# Patient Record
Sex: FEMALE | Race: WHITE | NOT HISPANIC OR LATINO | URBAN - METROPOLITAN AREA
[De-identification: names, ages, dates, MRNs, and addresses within clinical notes are randomized per-mention and may not be internally consistent; named-entity substitution may affect disease eponyms.]

---

## 2020-07-16 ENCOUNTER — OFFICE VISIT (OUTPATIENT)
Dept: URBAN - METROPOLITAN AREA CLINIC 68 | Facility: CLINIC | Age: 80
End: 2020-07-16

## 2020-07-17 ENCOUNTER — TELEPHONE ENCOUNTER (OUTPATIENT)
Dept: URBAN - METROPOLITAN AREA CLINIC 68 | Facility: CLINIC | Age: 80
End: 2020-07-17

## 2020-07-20 ENCOUNTER — TELEPHONE ENCOUNTER (OUTPATIENT)
Dept: URBAN - METROPOLITAN AREA CLINIC 68 | Facility: CLINIC | Age: 80
End: 2020-07-20

## 2020-08-17 LAB
COPY(IES) SENT TO:: (no result)
IMMUNOGLOBULIN A: (no result)
INTERPRETATION: (no result)
TISSUE TRANSGLUTAMINASE AB, IGA: (no result)

## 2020-08-21 ENCOUNTER — LAB OUTSIDE AN ENCOUNTER (OUTPATIENT)
Dept: URBAN - METROPOLITAN AREA CLINIC 68 | Facility: CLINIC | Age: 80
End: 2020-08-21

## 2020-08-23 ENCOUNTER — TELEPHONE ENCOUNTER (OUTPATIENT)
Dept: URBAN - METROPOLITAN AREA CLINIC 68 | Facility: CLINIC | Age: 80
End: 2020-08-23

## 2020-09-16 ENCOUNTER — OFFICE VISIT (OUTPATIENT)
Dept: URBAN - METROPOLITAN AREA CLINIC 68 | Facility: CLINIC | Age: 80
End: 2020-09-16

## 2022-06-04 ENCOUNTER — TELEPHONE ENCOUNTER (OUTPATIENT)
Dept: URBAN - METROPOLITAN AREA CLINIC 68 | Facility: CLINIC | Age: 82
End: 2022-06-04

## 2022-06-04 RX ORDER — POLYETHYLENE GLYCOL 3350 17 G/DOSE
POWDER (GRAM) ORAL PRN
Qty: 1 | Refills: 0 | OUTPATIENT
Start: 2013-06-19 | End: 2015-05-04

## 2022-06-04 RX ORDER — HYDROCORTISONE ACETATE 25 MG/1
SUPPOSITORY RECTAL
Qty: 12 | Refills: 12 | OUTPATIENT
Start: 2013-11-05 | End: 2014-07-22

## 2022-06-04 RX ORDER — ALPHA-D-GALACTOSIDASE 400 UNIT
BEANO(    2 TABLETS WITH EACH MEALS ) INACTIVE -HX ENTRY TABLET ORAL
OUTPATIENT
Start: 2008-11-04

## 2022-06-04 RX ORDER — MAGNESIUM OXIDE/MAG AA CHELATE 300 MG
MAGNESIUM( 300MG ORAL  DAILY ) INACTIVE -HX ENTRY CAPSULE ORAL DAILY
OUTPATIENT
Start: 2014-09-26

## 2022-06-04 RX ORDER — HYDROCORTISONE ACETATE 30 MG/1
SUPPOSITORY RECTAL
Qty: 28 | Refills: 0 | OUTPATIENT
Start: 2019-03-07 | End: 2019-03-21

## 2022-06-04 RX ORDER — PHYTONADIONE (VIT K1) 100 MCG
VITAMIN K (PHYTONADIONE)( 100MCG ORAL  DAILY ) INACTIVE -HX ENTRY TABLET ORAL DAILY
OUTPATIENT
Start: 2015-05-04

## 2022-06-04 RX ORDER — POLYETHYLENE GLYCOL 3350 17 G/DOSE
MIRALAX(  ORAL 17GRAMS AS NEEDED ) INACTIVE -HX ENTRY POWDER (GRAM) ORAL AS NEEDED
OUTPATIENT
Start: 2013-11-05

## 2022-06-04 RX ORDER — LACTOBACIL 2/BIFIDO 1/S.THERMO 900B CELL
VSL#3 DS(  ORAL  DAILY ) INACTIVE -HX ENTRY PACKET (EA) ORAL DAILY
OUTPATIENT
Start: 2019-03-07

## 2022-06-04 RX ORDER — HYDROCORTISONE ACETATE 25 MG/1
SUPPOSITORY RECTAL
Qty: 20 | Refills: 20 | OUTPATIENT
Start: 2018-05-04 | End: 2019-03-07

## 2022-06-04 RX ORDER — HYDROCORTISONE ACETATE 25 MG
SUPPOSITORY, RECTAL RECTAL
Qty: 14 | Refills: 0 | OUTPATIENT
Start: 2019-03-08 | End: 2019-03-15

## 2022-06-04 RX ORDER — ALENDRONATE SODIUM 70 MG/1
ALENDRONATE SODIUM( 70MG ORAL  ONE TIME A WEEK ) INACTIVE -HX ENTRY TABLET ORAL
OUTPATIENT
Start: 2014-09-26

## 2022-06-04 RX ORDER — ALENDRONATE SODIUM 70 MG
FOSAMAX(    1 TABLET WEEKLY ON AN EMPTY STOMACH ) INACTIVE -HX ENTRY TABLET ORAL
OUTPATIENT
Start: 2008-11-04

## 2022-06-04 RX ORDER — LORATADINE 10 MG
TABLET,DISINTEGRATING ORAL DAILY
Qty: 30 | Refills: 0 | OUTPATIENT
Start: 2018-04-10 | End: 2018-05-10

## 2022-06-04 RX ORDER — CLOTRIMAZOLE 2 G/100G
CREAM VAGINAL
Qty: 1 | Refills: 0 | OUTPATIENT
Start: 2013-02-20 | End: 2013-02-23

## 2022-06-04 RX ORDER — PANTOPRAZOLE SODIUM 40 MG/1
PANTOPRAZOLE SODIUM( 40MG ORAL  DAILY ) INACTIVE -HX ENTRY TABLET, DELAYED RELEASE ORAL DAILY
OUTPATIENT
Start: 2014-12-04

## 2022-06-04 RX ORDER — ESTRADIOL 14 UG/D
MENOSTAR( 14MCG/24HR TRANSDERMAL  AS DIRECTED ) INACTIVE -HX ENTRY PATCH TRANSDERMAL AS DIRECTED
OUTPATIENT
Start: 2015-05-04

## 2022-06-04 RX ORDER — SODIUM SULFATE, POTASSIUM SULFATE, MAGNESIUM SULFATE 17.5; 3.13; 1.6 G/ML; G/ML; G/ML
SOLUTION, CONCENTRATE ORAL AS DIRECTED
Qty: 1 | Refills: 0 | OUTPATIENT
Start: 2015-07-17 | End: 2017-10-03

## 2022-06-04 RX ORDER — LACTOBACILLUS CASEI/FOLIC ACID 60-1.25 MG
CAPSULE ORAL DAILY
Qty: 30 | Refills: 30 | OUTPATIENT
Start: 2017-10-03 | End: 2017-10-30

## 2022-06-04 RX ORDER — SOFOSBUVIR 400 MG/1
TABLET, FILM COATED ORAL DAILY
Qty: 30 | Refills: 30 | OUTPATIENT
Start: 2014-05-13 | End: 2015-02-20

## 2022-06-04 RX ORDER — DOCUSATE SODIUM 100 MG/1
COLACE( 100MG ORAL   AS NEEDED ) INACTIVE -HX ENTRY CAPSULE ORAL AS NEEDED
OUTPATIENT
Start: 2017-10-03

## 2022-06-04 RX ORDER — FLUCONAZOLE 150 MG/1
TABLET ORAL AS DIRECTED
Qty: 2 | Refills: 0 | OUTPATIENT
Start: 2013-02-20 | End: 2013-02-22

## 2022-06-04 RX ORDER — RALOXIFENE HYDROCHLORIDE 60 MG/1
RALOXIFENE HCL( 60MG ORAL  DAILY ) INACTIVE -HX ENTRY TABLET, FILM COATED ORAL DAILY
OUTPATIENT
Start: 2017-10-03

## 2022-06-04 RX ORDER — B-COMPLEX WITH VITAMIN C
VITAMIN B COMPLEX(  ORAL 1-2 TABS DAILY ) INACTIVE -HX ENTRY TABLET ORAL DAILY
OUTPATIENT
Start: 2017-10-03

## 2022-06-04 RX ORDER — B-COMPLEX WITH VITAMIN C
VITAMIN B COMPLEX(  ORAL  DAILY ) INACTIVE -HX ENTRY TABLET ORAL DAILY
OUTPATIENT
Start: 2014-05-13

## 2022-06-04 RX ORDER — LINACLOTIDE 145 UG/1
CAPSULE, GELATIN COATED ORAL DAILY
Qty: 30 | Refills: 30 | OUTPATIENT
Start: 2018-05-04 | End: 2019-03-07

## 2022-06-04 RX ORDER — OMEGA-3S/DHA/EPA/FISH OIL/D3 300MG-1000
CAPSULE ORAL DAILY
Qty: 0 | Refills: 0 | OUTPATIENT
Start: 2015-05-04 | End: 2015-05-04

## 2022-06-04 RX ORDER — GLUCOSAMINE/CHONDR SU A SOD 750-600 MG
VITAMIN E( 1000UNIT ORAL  DAILY ) INACTIVE -HX ENTRY TABLET ORAL DAILY
OUTPATIENT
Start: 2014-09-26

## 2022-06-05 ENCOUNTER — TELEPHONE ENCOUNTER (OUTPATIENT)
Dept: URBAN - METROPOLITAN AREA CLINIC 68 | Facility: CLINIC | Age: 82
End: 2022-06-05

## 2022-06-05 RX ORDER — ACETAMINOPHEN 325 MG
BIOTIN( 5000MCG ORAL  DAILY ) ACTIVE -HX ENTRY TABLET ORAL DAILY
Status: ACTIVE | COMMUNITY
Start: 2020-09-16

## 2022-06-05 RX ORDER — DENOSUMAB 60 MG/ML
PROLIA( 60MG/ML SUBCUTANEOUS   ) ACTIVE -HX ENTRY INJECTION SUBCUTANEOUS
Status: ACTIVE | COMMUNITY
Start: 2020-09-16

## 2022-06-05 RX ORDER — METHYLDOPA/HYDROCHLOROTHIAZIDE 250MG-15MG
VITAMIN K2( 100MCG ORAL   ) ACTIVE -HX ENTRY TABLET ORAL
Status: ACTIVE | COMMUNITY
Start: 2020-09-16

## 2022-06-05 RX ORDER — DICYCLOMINE HYDROCHLORIDE 10 MG/1
CAPSULE ORAL
Qty: 60 | Refills: 60 | Status: ACTIVE | COMMUNITY
Start: 2020-07-17

## 2022-06-05 RX ORDER — CHOLECALCIFEROL (VITAMIN D3) 125 MCG
VITAMIN D3( 125 MCG(5000 UT) ORAL   ) ACTIVE -HX ENTRY CAPSULE ORAL
Status: ACTIVE | COMMUNITY
Start: 2020-09-16

## 2022-06-05 RX ORDER — ATORVASTATIN CALCIUM 10 MG/1
ATORVASTATIN CALCIUM( 10MG ORAL 1 DAILY ) ACTIVE -HX ENTRY TABLET, FILM COATED ORAL DAILY
Status: ACTIVE | COMMUNITY
Start: 2020-09-16

## 2022-06-05 RX ORDER — CONJUGATED ESTROGENS 0.62 MG/G
PREMARIN( 0.625MG/GM VAGINAL   TWICE A WEEK ) ACTIVE -HX ENTRY CREAM VAGINAL
Status: ACTIVE | COMMUNITY
Start: 2020-09-16

## 2022-06-05 RX ORDER — MULTIVIT-MIN/FOLIC/VIT K/LYCOP 400-300MCG
VITAMIN D3( 1000UNIT ORAL  DAILY ) ACTIVE -HX ENTRY TABLET ORAL DAILY
Status: ACTIVE | COMMUNITY
Start: 2020-09-16

## 2022-06-25 ENCOUNTER — TELEPHONE ENCOUNTER (OUTPATIENT)
Age: 82
End: 2022-06-25

## 2022-06-25 RX ORDER — FENUGREEK SEED/BL.THISTLE/ANIS 340 MG
PROBIOTIC PRIMADOPHILUS ORIGINAL(   5 BILLION CFU DAILY ) INACTIVE -HX ENTRY CAPSULE ORAL DAILY
OUTPATIENT
Start: 2017-10-03

## 2022-06-25 RX ORDER — CLOTRIMAZOLE 2 G/100G
CREAM VAGINAL
Qty: 1 | Refills: 0 | OUTPATIENT
Start: 2013-02-20 | End: 2013-02-23

## 2022-06-25 RX ORDER — HYDROCORTISONE ACETATE 30 MG/1
SUPPOSITORY RECTAL
Qty: 28 | Refills: 0 | OUTPATIENT
Start: 2019-03-07 | End: 2019-03-21

## 2022-06-25 RX ORDER — HYDROCORTISONE ACETATE 25 MG/1
ANUSOL HC SUPP.(    1 PER RECTUM EACH NIGHT ) INACTIVE -HX ENTRY SUPPOSITORY RECTAL
OUTPATIENT
Start: 2009-07-07

## 2022-06-25 RX ORDER — SODIUM SULFATE, POTASSIUM SULFATE, MAGNESIUM SULFATE 17.5; 3.13; 1.6 G/ML; G/ML; G/ML
SOLUTION, CONCENTRATE ORAL AS DIRECTED
Qty: 1 | Refills: 0 | OUTPATIENT
Start: 2015-07-17 | End: 2017-10-03

## 2022-06-25 RX ORDER — POLYETHYLENE GLYCOL 3350 17 G
POWDER IN PACKET (EA) ORAL DAILY
Qty: 30 | Refills: 0 | OUTPATIENT
Start: 2018-04-10 | End: 2018-05-10

## 2022-06-25 RX ORDER — LACTOBACIL 2/BIFIDO 1/S.THERMO 900B CELL
VSL#3 DS(  ORAL  DAILY ) INACTIVE -HX ENTRY PACKET (EA) ORAL DAILY
OUTPATIENT
Start: 2019-03-07

## 2022-06-25 RX ORDER — LACTOBACILLUS CASEI/FOLIC ACID 60-1.25 MG
CAPSULE ORAL DAILY
Qty: 30 | Refills: 30 | OUTPATIENT
Start: 2017-10-03 | End: 2017-10-30

## 2022-06-25 RX ORDER — CALCIUM CIT/MAG/D3/ZN/COP/MANG 250-40-125
CALCIUM CITRATE(    2 QD ) INACTIVE -HX ENTRY TABLET ORAL
OUTPATIENT
Start: 2009-07-07

## 2022-06-25 RX ORDER — CALCIUM CARBONATE/VITAMIN D3 600 MG-10
CALCIUM(    DAILY ) INACTIVE -HX ENTRY TABLET ORAL DAILY
OUTPATIENT
Start: 2014-09-26

## 2022-06-25 RX ORDER — FERROUS SULFATE 325(65) MG
TABLET ORAL DAILY
Qty: 0 | Refills: 0 | OUTPATIENT
Start: 2015-05-04 | End: 2015-05-04

## 2022-06-25 RX ORDER — ESTRADIOL 14 UG/D
MENOSTAR( 14MCG/24HR TRANSDERMAL  AS DIRECTED ) INACTIVE -HX ENTRY PATCH TRANSDERMAL AS DIRECTED
OUTPATIENT
Start: 2015-05-04

## 2022-06-25 RX ORDER — LINACLOTIDE 145 UG/1
CAPSULE, GELATIN COATED ORAL DAILY
Qty: 30 | Refills: 30 | OUTPATIENT
Start: 2018-05-04 | End: 2019-03-07

## 2022-06-25 RX ORDER — PANTOPRAZOLE 40 MG/1
PANTOPRAZOLE SODIUM( 40MG ORAL  DAILY ) INACTIVE -HX ENTRY TABLET, DELAYED RELEASE ORAL DAILY
OUTPATIENT
Start: 2014-12-04

## 2022-06-25 RX ORDER — PHYTONADIONE (VIT K1) 100 MCG
VITAMIN K (PHYTONADIONE)( 100MCG ORAL  DAILY ) INACTIVE -HX ENTRY TABLET ORAL DAILY
OUTPATIENT
Start: 2015-05-04

## 2022-06-25 RX ORDER — LORATADINE 5 MG
TABLET,CHEWABLE ORAL PRN
Qty: 1 | Refills: 0 | OUTPATIENT
Start: 2013-06-19 | End: 2015-05-04

## 2022-06-25 RX ORDER — DOCUSATE SODIUM 100 MG/1
COLACE( 100MG ORAL   AS NEEDED ) INACTIVE -HX ENTRY CAPSULE ORAL AS NEEDED
OUTPATIENT
Start: 2017-10-03

## 2022-06-25 RX ORDER — MAGNESIUM OXIDE/MAG AA CHELATE 300 MG
MAGNESIUM( 300MG ORAL  DAILY ) INACTIVE -HX ENTRY CAPSULE ORAL DAILY
OUTPATIENT
Start: 2014-09-26

## 2022-06-25 RX ORDER — HYDROCORTISONE ACETATE 25 MG
SUPPOSITORY, RECTAL RECTAL
Qty: 14 | Refills: 0 | OUTPATIENT
Start: 2019-03-08 | End: 2019-03-15

## 2022-06-25 RX ORDER — LEVOTHYROXINE SODIUM 100 UG/1
LEVOTHYROXINE(   0.25MCG DAILY ) INACTIVE -HX ENTRY CAPSULE ORAL DAILY
OUTPATIENT
Start: 2020-07-16

## 2022-06-25 RX ORDER — ERGOCALCIFEROL 1.25 MG/1
VITAMIN D-400(    EVERY NOW AND THEN ) INACTIVE -HX ENTRY CAPSULE ORAL
OUTPATIENT
Start: 2009-07-07

## 2022-06-25 RX ORDER — FENUGREEK SEED/BL.THISTLE/ANIS 340 MG
PROBIOTIC MEDICAL FOOD(    DAILY ) INACTIVE -HX ENTRY CAPSULE ORAL DAILY
OUTPATIENT
Start: 2019-03-07

## 2022-06-25 RX ORDER — SOFOSBUVIR 400 MG/1
TABLET, FILM COATED ORAL DAILY
Qty: 30 | Refills: 30 | OUTPATIENT
Start: 2014-05-13 | End: 2015-02-20

## 2022-06-25 RX ORDER — POLYETHYLENE GLYCOL 3350, SODIUM SULFATE, SODIUM CHLORIDE, POTASSIUM CHLORIDE, ASCORBIC ACID, SODIUM ASCORBATE 7.5-2.691G
MOVIPREP(    AS DIRECTED ) INACTIVE -HX ENTRY KIT ORAL AS DIRECTED
OUTPATIENT
Start: 2010-07-20

## 2022-06-25 RX ORDER — RALOXIFENE HYDROCHLORIDE 60 MG/1
RALOXIFENE HCL( 60MG ORAL  DAILY ) INACTIVE -HX ENTRY TABLET, FILM COATED ORAL DAILY
OUTPATIENT
Start: 2017-10-03

## 2022-06-25 RX ORDER — B-COMPLEX WITH VITAMIN C
VITAMIN B COMPLEX(  ORAL  DAILY ) INACTIVE -HX ENTRY TABLET ORAL DAILY
OUTPATIENT
Start: 2014-05-13

## 2022-06-25 RX ORDER — LORATADINE 5 MG
MIRALAX(  ORAL 17GRAMS AS NEEDED ) INACTIVE -HX ENTRY TABLET,CHEWABLE ORAL AS NEEDED
OUTPATIENT
Start: 2013-11-05

## 2022-06-25 RX ORDER — OMEGA-3/DHA/EPA/FISH OIL 1000 MG
CAPSULE ORAL
OUTPATIENT
Start: 2009-07-07 | End: 2014-09-26

## 2022-06-25 RX ORDER — B-COMPLEX WITH VITAMIN C
VITAMIN B COMPLEX(  ORAL 1-2 TABS DAILY ) INACTIVE -HX ENTRY TABLET ORAL DAILY
OUTPATIENT
Start: 2017-10-03

## 2022-06-25 RX ORDER — WITCH HAZEL 50 G/100ML
SOLUTION RECTAL
Qty: 14 | Refills: 0 | OUTPATIENT
Start: 2019-03-08 | End: 2019-03-15

## 2022-06-25 RX ORDER — LANSOPRAZOLE 30 MG/1
PREVACID(    1 TABLET ORALLY 1 HOUR BEFORE BREAKFAST. ) INACTIVE -HX ENTRY TABLET, ORALLY DISINTEGRATING, DELAYED RELEASE ORAL
OUTPATIENT
Start: 2008-12-01

## 2022-06-25 RX ORDER — ALENDRONATE SODIUM 70 MG/1
ALENDRONATE SODIUM( 70MG ORAL  ONE TIME A WEEK ) INACTIVE -HX ENTRY TABLET ORAL
OUTPATIENT
Start: 2014-09-26

## 2022-06-25 RX ORDER — FLUCONAZOLE 150 MG
TABLET ORAL AS DIRECTED
Qty: 2 | Refills: 0 | OUTPATIENT
Start: 2013-02-20 | End: 2013-02-22

## 2022-06-25 RX ORDER — HYDROCORTISONE ACETATE 25 MG/1
SUPPOSITORY RECTAL
Qty: 20 | Refills: 20 | OUTPATIENT
Start: 2018-05-04 | End: 2019-03-07

## 2022-06-25 RX ORDER — GUARANA 1000 MG
MILK THISTLE(    AS DIRECTED ) INACTIVE -HX ENTRY TABLET ORAL AS DIRECTED
OUTPATIENT
Start: 2010-03-15

## 2022-06-25 RX ORDER — GLUCOSAMINE/CHONDR SU A SOD 750-600 MG
VITAMIN E( 1000UNIT ORAL  DAILY ) INACTIVE -HX ENTRY TABLET ORAL DAILY
OUTPATIENT
Start: 2014-09-26

## 2022-06-25 RX ORDER — ALENDRONATE SODIUM 70 MG/1
FOSAMAX(    1 TABLET WEEKLY ON AN EMPTY STOMACH ) INACTIVE -HX ENTRY TABLET ORAL
OUTPATIENT
Start: 2008-11-04

## 2022-06-25 RX ORDER — HYDROCORTISONE ACETATE 25 MG/1
SUPPOSITORY RECTAL
Qty: 12 | Refills: 12 | OUTPATIENT
Start: 2013-11-05 | End: 2014-07-22

## 2022-06-26 ENCOUNTER — TELEPHONE ENCOUNTER (OUTPATIENT)
Age: 82
End: 2022-06-26

## 2022-06-26 RX ORDER — DENOSUMAB 60 MG/ML
PROLIA( 60MG/ML SUBCUTANEOUS   ) ACTIVE -HX ENTRY INJECTION SUBCUTANEOUS
Status: ACTIVE | COMMUNITY
Start: 2020-09-16

## 2022-06-26 RX ORDER — METHYLDOPA/HYDROCHLOROTHIAZIDE 250MG-15MG
VITAMIN K2( 100MCG ORAL   ) ACTIVE -HX ENTRY TABLET ORAL
Status: ACTIVE | COMMUNITY
Start: 2020-09-16

## 2022-06-26 RX ORDER — CONJUGATED ESTROGENS 0.62 MG/G
PREMARIN( 0.625MG/GM VAGINAL   TWICE A WEEK ) ACTIVE -HX ENTRY CREAM VAGINAL
Status: ACTIVE | COMMUNITY
Start: 2020-09-16

## 2022-06-26 RX ORDER — MULTIVIT-MIN/FOLIC/VIT K/LYCOP 400-300MCG
VITAMIN D3( 1000UNIT ORAL  DAILY ) ACTIVE -HX ENTRY TABLET ORAL DAILY
Status: ACTIVE | COMMUNITY
Start: 2020-09-16

## 2022-06-26 RX ORDER — CHOLECALCIFEROL (VITAMIN D3) 125 MCG
VITAMIN D3( 125 MCG(5000 UT) ORAL   ) ACTIVE -HX ENTRY CAPSULE ORAL
Status: ACTIVE | COMMUNITY
Start: 2020-09-16

## 2022-06-26 RX ORDER — DICYCLOMINE HYDROCHLORIDE 10 MG/1
CAPSULE ORAL
Qty: 60 | Refills: 60 | Status: ACTIVE | COMMUNITY
Start: 2020-07-17

## 2022-06-26 RX ORDER — ATORVASTATIN CALCIUM 10 MG/1
ATORVASTATIN CALCIUM( 10MG ORAL 1 DAILY ) ACTIVE -HX ENTRY TABLET, FILM COATED ORAL DAILY
Status: ACTIVE | COMMUNITY
Start: 2020-09-16

## 2022-10-28 ENCOUNTER — OFFICE VISIT (OUTPATIENT)
Dept: URBAN - METROPOLITAN AREA CLINIC 68 | Facility: CLINIC | Age: 82
End: 2022-10-28

## 2022-10-28 RX ORDER — CONJUGATED ESTROGENS 0.62 MG/G
PREMARIN( 0.625MG/GM VAGINAL   TWICE A WEEK ) ACTIVE -HX ENTRY CREAM VAGINAL
Status: ACTIVE | COMMUNITY
Start: 2020-09-16

## 2022-10-28 RX ORDER — METHYLDOPA/HYDROCHLOROTHIAZIDE 250MG-15MG
VITAMIN K2( 100MCG ORAL   ) ACTIVE -HX ENTRY TABLET ORAL
Status: ACTIVE | COMMUNITY
Start: 2020-09-16

## 2022-10-28 RX ORDER — CHOLECALCIFEROL (VITAMIN D3) 125 MCG
VITAMIN D3( 125 MCG(5000 UT) ORAL   ) ACTIVE -HX ENTRY CAPSULE ORAL
Status: ACTIVE | COMMUNITY
Start: 2020-09-16

## 2022-10-28 RX ORDER — ATORVASTATIN CALCIUM 10 MG/1
ATORVASTATIN CALCIUM( 10MG ORAL 1 DAILY ) ACTIVE -HX ENTRY TABLET, FILM COATED ORAL DAILY
Status: ACTIVE | COMMUNITY
Start: 2020-09-16

## 2022-10-28 RX ORDER — DICYCLOMINE HYDROCHLORIDE 10 MG/1
CAPSULE ORAL
Qty: 60 | Refills: 60 | Status: DISCONTINUED | COMMUNITY
Start: 2020-07-17

## 2022-10-28 RX ORDER — MULTIVIT-MIN/FOLIC/VIT K/LYCOP 400-300MCG
VITAMIN D3( 1000UNIT ORAL  DAILY ) ACTIVE -HX ENTRY TABLET ORAL DAILY
Status: ACTIVE | COMMUNITY
Start: 2020-09-16

## 2022-10-28 RX ORDER — DENOSUMAB 60 MG/ML
PROLIA( 60MG/ML SUBCUTANEOUS   ) ACTIVE -HX ENTRY INJECTION SUBCUTANEOUS
Status: ACTIVE | COMMUNITY
Start: 2020-09-16

## 2022-10-28 RX ORDER — ACETAMINOPHEN 325 MG
BIOTIN( 5000MCG ORAL  DAILY ) ACTIVE -HX ENTRY TABLET ORAL DAILY
Status: ACTIVE | COMMUNITY
Start: 2020-09-16

## 2022-10-28 NOTE — EXAM-MIGRATED EXAMINATIONS
General Examination: General appearance: -> alert, pleasant, well-nourished and in no acute distress   Head: -> normocephalic, atraumatic   Eyes: -> normal   Neck / thyroid: -> neck is supple, with full range of motion and no cervical lymphadenopathy   Neurologic: -> alert and oriented   Skin: -> skin is warm and dry, with no rashes, good skin turgor and normal hair distribution   Heart: -> regular rate and rhythm without murmurs, gallops, clicks or rubs   Lungs: -> clear to auscultation bilaterally, with good air movement and no rales, rhonchi or wheezes   Chest: -> chest wall with no costochondral junction tenderness, no rib deformity and normal shape and expansion   Abdomen: -> soft with good bowel sounds, nontender, and no masses or hepatosplenomegaly , negative Jaimes's sign   Extremities: -> normal extremity with no clubbing, cyanosis or edema

## 2022-10-28 NOTE — HPI-MIGRATED HPI
Transition to Care : 82 y.o. WF with chronic constipation ongoing for years who is here for follow up of constipation.  She is s/p a colonoscopy in 2015 which showed hemorrhoids. She is concerned about taking Miralax daily because it is a chemical. She describes not eating a lot and has at times a bitter taste in her mouth.  She has lactose intolerance. She is recommended to take TUMS prn for dyspepsia. She denies any gib, no n/v.

## 2023-06-29 ENCOUNTER — LAB OUTSIDE AN ENCOUNTER (OUTPATIENT)
Dept: URBAN - METROPOLITAN AREA CLINIC 68 | Facility: CLINIC | Age: 83
End: 2023-06-29

## 2023-06-29 ENCOUNTER — WEB ENCOUNTER (OUTPATIENT)
Dept: URBAN - METROPOLITAN AREA CLINIC 68 | Facility: CLINIC | Age: 83
End: 2023-06-29

## 2023-06-29 ENCOUNTER — OFFICE VISIT (OUTPATIENT)
Dept: URBAN - METROPOLITAN AREA CLINIC 68 | Facility: CLINIC | Age: 83
End: 2023-06-29
Payer: MEDICARE

## 2023-06-29 VITALS
SYSTOLIC BLOOD PRESSURE: 172 MMHG | HEIGHT: 63 IN | DIASTOLIC BLOOD PRESSURE: 80 MMHG | WEIGHT: 110 LBS | BODY MASS INDEX: 19.49 KG/M2

## 2023-06-29 DIAGNOSIS — R10.13 ABDOMINAL DISCOMFORT, EPIGASTRIC: ICD-10-CM

## 2023-06-29 DIAGNOSIS — K62.5 RECTAL BLEEDING: ICD-10-CM

## 2023-06-29 DIAGNOSIS — K59.09 CHRONIC CONSTIPATION: ICD-10-CM

## 2023-06-29 PROBLEM — 119416008: Status: ACTIVE | Noted: 2023-06-29

## 2023-06-29 PROBLEM — 12063002: Status: ACTIVE | Noted: 2023-06-29

## 2023-06-29 PROCEDURE — 99214 OFFICE O/P EST MOD 30 MIN: CPT

## 2023-06-29 RX ORDER — CHOLECALCIFEROL (VITAMIN D3) 125 MCG
VITAMIN D3( 125 MCG(5000 UT) ORAL   ) ACTIVE -HX ENTRY CAPSULE ORAL
Status: ACTIVE | COMMUNITY
Start: 2020-09-16

## 2023-06-29 RX ORDER — ESCITALOPRAM 10 MG/1
TABLET, FILM COATED ORAL
Qty: 30 TABLET | Status: ACTIVE | COMMUNITY

## 2023-06-29 RX ORDER — LIOTHYRONINE SODIUM 5 UG/1
TABLET ORAL
Qty: 90 TABLET | Status: ACTIVE | COMMUNITY

## 2023-06-29 RX ORDER — POTASSIUM CHLORIDE 10 MEQ/1
TABLET, FILM COATED, EXTENDED RELEASE ORAL
Qty: 180 TABLET | Status: ACTIVE | COMMUNITY

## 2023-06-29 RX ORDER — METHYLDOPA/HYDROCHLOROTHIAZIDE 250MG-15MG
VITAMIN K2( 100MCG ORAL   ) ACTIVE -HX ENTRY TABLET ORAL
Status: ACTIVE | COMMUNITY
Start: 2020-09-16

## 2023-06-29 RX ORDER — ZOLPIDEM TARTRATE 5 MG/1
TAKE 1 TABLET BY MOUTH AT BEDTIME TABLET, FILM COATED ORAL
Qty: 90 EACH | Refills: 0 | Status: ACTIVE | COMMUNITY

## 2023-06-29 RX ORDER — ACETAMINOPHEN 325 MG
BIOTIN( 5000MCG ORAL  DAILY ) ACTIVE -HX ENTRY TABLET ORAL DAILY
Status: ACTIVE | COMMUNITY
Start: 2020-09-16

## 2023-06-29 RX ORDER — MULTIVIT-MIN/FOLIC/VIT K/LYCOP 400-300MCG
VITAMIN D3( 1000UNIT ORAL  DAILY ) ACTIVE -HX ENTRY TABLET ORAL DAILY
Status: ACTIVE | COMMUNITY
Start: 2020-09-16

## 2023-06-29 RX ORDER — LEVOTHYROXINE SODIUM 0.05 MG/1
TAKE 1 TABLET BY MOUTH EVERY DAY TABLET ORAL
Qty: 30 EACH | Refills: 0 | Status: ACTIVE | COMMUNITY

## 2023-06-29 RX ORDER — FUROSEMIDE 20 MG/1
TABLET ORAL
Qty: 90 TABLET | Status: ACTIVE | COMMUNITY

## 2023-06-29 RX ORDER — LOSARTAN POTASSIUM 25 MG/1
TAKE 1 TABLET BY MOUTH EVERY EVENING TABLET, FILM COATED ORAL
Qty: 90 EACH | Refills: 0 | Status: ACTIVE | COMMUNITY

## 2023-06-29 RX ORDER — WARFARIN SODIUM 5 MG/1
1 TABLET TABLET ORAL ONCE A DAY
Status: ACTIVE | COMMUNITY

## 2023-06-29 RX ORDER — DONEPEZIL HYDROCHLORIDE 5 MG/1
TAKE 1 TABLET BY MOUTH EVERY DAY TABLET, FILM COATED ORAL
Qty: 30 EACH | Refills: 0 | Status: ACTIVE | COMMUNITY

## 2023-06-29 NOTE — HPI-TODAY'S VISIT:
81 y/o F with extensive history of CAD, aortic valve replacement (3 yrs ago), chronic constipation (takes "smooth move" tea and prn dulcolax), Hepatitis C (resolved >10 yrs ago), IHs (colonoscopy 2015, Dr. Nino), personal history of colon polyps, and lactose intolerance. Pt presents for persistent rectal bleeding. She does report associated chronic constipation which worsens symptoms. She describes bleeding as a moderate amount of gross blood on toilet tissue, occurring more often than not when wiping after BMs. She admits she has great difficulty with her memory since her AV replacement surgery (cardiologist, Dr. Lucas) and is followed by a neurologist (Dr. Santos). She does have a home health nurse that helps her a few times throughout the week and normally comes with her to medical visits, although today she presents alone. Discussed recommendation for flex sig and provided instructions for OTC fleet enema. Additionally, discussed miralax for improvement of constipation and pt is advised to return to office one week prior to flex sig with her home health nurse for re-evaluation and instructions for prep prior to procedure. Patient otherwise admits to some epigastric pain to palpation and an abdominal US is ordered today. Pt denies vomiting, dysphagia, odynophagia, heartburn, diarrhea, or weight loss.

## 2023-06-29 NOTE — PHYSICAL EXAM GASTROINTESTINAL
Abdomen , soft, positive epigastric tenderness on moderate palpation, nondistended , no guarding or rigidity , no masses palpable , normal bowel sounds , Liver and Spleen , no hepatomegaly present , no hepatosplenomegaly , liver nontender , spleen not palpable

## 2023-07-13 ENCOUNTER — OFFICE VISIT (OUTPATIENT)
Dept: URBAN - METROPOLITAN AREA CLINIC 68 | Facility: CLINIC | Age: 83
End: 2023-07-13

## 2023-07-13 RX ORDER — MULTIVIT-MIN/FOLIC/VIT K/LYCOP 400-300MCG
VITAMIN D3( 1000UNIT ORAL  DAILY ) ACTIVE -HX ENTRY TABLET ORAL DAILY
Status: ACTIVE | COMMUNITY
Start: 2020-09-16

## 2023-07-13 RX ORDER — CHOLECALCIFEROL (VITAMIN D3) 125 MCG
VITAMIN D3( 125 MCG(5000 UT) ORAL   ) ACTIVE -HX ENTRY CAPSULE ORAL
Status: ACTIVE | COMMUNITY
Start: 2020-09-16

## 2023-07-13 RX ORDER — ZOLPIDEM TARTRATE 5 MG/1
TAKE 1 TABLET BY MOUTH AT BEDTIME TABLET, FILM COATED ORAL
Qty: 90 EACH | Refills: 0 | Status: ACTIVE | COMMUNITY

## 2023-07-13 RX ORDER — POTASSIUM CHLORIDE 10 MEQ/1
TABLET, FILM COATED, EXTENDED RELEASE ORAL
Qty: 180 TABLET | Status: ACTIVE | COMMUNITY

## 2023-07-13 RX ORDER — DONEPEZIL HYDROCHLORIDE 5 MG/1
TAKE 1 TABLET BY MOUTH EVERY DAY TABLET, FILM COATED ORAL
Qty: 30 EACH | Refills: 0 | Status: ACTIVE | COMMUNITY

## 2023-07-13 RX ORDER — ACETAMINOPHEN 325 MG
BIOTIN( 5000MCG ORAL  DAILY ) ACTIVE -HX ENTRY TABLET ORAL DAILY
Status: ACTIVE | COMMUNITY
Start: 2020-09-16

## 2023-07-13 RX ORDER — WARFARIN SODIUM 5 MG/1
1 TABLET TABLET ORAL ONCE A DAY
Status: ACTIVE | COMMUNITY

## 2023-07-13 RX ORDER — FUROSEMIDE 20 MG/1
TABLET ORAL
Qty: 90 TABLET | Status: ACTIVE | COMMUNITY

## 2023-07-13 RX ORDER — ESCITALOPRAM 10 MG/1
TABLET, FILM COATED ORAL
Qty: 30 TABLET | Status: ACTIVE | COMMUNITY

## 2023-07-13 RX ORDER — LIOTHYRONINE SODIUM 5 UG/1
TABLET ORAL
Qty: 90 TABLET | Status: ACTIVE | COMMUNITY

## 2023-07-13 RX ORDER — LEVOTHYROXINE SODIUM 0.05 MG/1
TAKE 1 TABLET BY MOUTH EVERY DAY TABLET ORAL
Qty: 30 EACH | Refills: 0 | Status: ACTIVE | COMMUNITY

## 2023-07-13 RX ORDER — METHYLDOPA/HYDROCHLOROTHIAZIDE 250MG-15MG
VITAMIN K2( 100MCG ORAL   ) ACTIVE -HX ENTRY TABLET ORAL
Status: ACTIVE | COMMUNITY
Start: 2020-09-16

## 2023-07-13 RX ORDER — LOSARTAN POTASSIUM 25 MG/1
TAKE 1 TABLET BY MOUTH EVERY EVENING TABLET, FILM COATED ORAL
Qty: 90 EACH | Refills: 0 | Status: ACTIVE | COMMUNITY

## 2023-07-13 NOTE — HPI-TODAY'S VISIT:
81 y/o F with extensive history of CAD, aortic valve replacement (3 yrs ago), chronic constipation (takes "smooth move" tea and prn dulcolax), Hepatitis C (resolved >10 yrs ago), IHs (colonoscopy 2015, Dr. Nino), personal history of colon polyps, and lactose intolerance. Pt presents for persistent rectal bleeding and does have an upcoming flex-sig scheduled with Dr. Gracia. She additionally admits to continued symptoms of constipation, unchanged form her last visit. Pt reports signifcant memory problems and did not have a historian to assist her at her last visit, although she does prsent with home nurse today***. She additionally had been recommended for abdominal US for evaluation of epoigastric pain, but states***.  Discussed recommendation to keep visit for flex sig and detailed instructions for OTC fleet enema were reviewed. Pt is currently utilizing *** for constipation and was advised to ***.   Pt denies vomiting, dysphagia, odynophagia, heartburn, diarrhea, or weight loss.

## 2023-07-19 ENCOUNTER — TELEPHONE ENCOUNTER (OUTPATIENT)
Dept: URBAN - METROPOLITAN AREA CLINIC 68 | Facility: CLINIC | Age: 83
End: 2023-07-19

## 2023-07-20 ENCOUNTER — TELEPHONE ENCOUNTER (OUTPATIENT)
Dept: URBAN - METROPOLITAN AREA CLINIC 68 | Facility: CLINIC | Age: 83
End: 2023-07-20

## 2023-07-21 ENCOUNTER — OFFICE VISIT (OUTPATIENT)
Dept: URBAN - METROPOLITAN AREA SURGERY CENTER 12 | Facility: SURGERY CENTER | Age: 83
End: 2023-07-21

## 2023-07-25 ENCOUNTER — OFFICE VISIT (OUTPATIENT)
Dept: URBAN - METROPOLITAN AREA CLINIC 67 | Facility: CLINIC | Age: 83
End: 2023-07-25
Payer: MEDICARE

## 2023-07-25 DIAGNOSIS — R10.13 ABDOMINAL DISCOMFORT, EPIGASTRIC: ICD-10-CM

## 2023-07-25 PROCEDURE — 76705 ECHO EXAM OF ABDOMEN: CPT | Performed by: INTERNAL MEDICINE

## 2023-07-25 PROCEDURE — 93976 VASCULAR STUDY: CPT | Performed by: INTERNAL MEDICINE

## 2023-08-04 ENCOUNTER — TELEPHONE ENCOUNTER (OUTPATIENT)
Dept: URBAN - METROPOLITAN AREA CLINIC 68 | Facility: CLINIC | Age: 83
End: 2023-08-04

## 2023-09-20 ENCOUNTER — LAB OUTSIDE AN ENCOUNTER (OUTPATIENT)
Dept: URBAN - METROPOLITAN AREA CLINIC 68 | Facility: CLINIC | Age: 83
End: 2023-09-20

## 2023-09-20 ENCOUNTER — WEB ENCOUNTER (OUTPATIENT)
Dept: URBAN - METROPOLITAN AREA CLINIC 68 | Facility: CLINIC | Age: 83
End: 2023-09-20

## 2023-09-20 ENCOUNTER — OFFICE VISIT (OUTPATIENT)
Dept: URBAN - METROPOLITAN AREA CLINIC 68 | Facility: CLINIC | Age: 83
End: 2023-09-20
Payer: MEDICARE

## 2023-09-20 VITALS
DIASTOLIC BLOOD PRESSURE: 80 MMHG | WEIGHT: 110.2 LBS | TEMPERATURE: 97.2 F | BODY MASS INDEX: 19.53 KG/M2 | SYSTOLIC BLOOD PRESSURE: 118 MMHG | HEART RATE: 77 BPM | HEIGHT: 63 IN | OXYGEN SATURATION: 99 %

## 2023-09-20 DIAGNOSIS — K62.5 RECTAL BLEEDING: ICD-10-CM

## 2023-09-20 DIAGNOSIS — K64.0 GRADE I HEMORRHOIDS: ICD-10-CM

## 2023-09-20 PROCEDURE — 99214 OFFICE O/P EST MOD 30 MIN: CPT | Performed by: INTERNAL MEDICINE

## 2023-09-20 RX ORDER — DONEPEZIL HYDROCHLORIDE 5 MG/1
TAKE 1 TABLET BY MOUTH EVERY DAY TABLET, FILM COATED ORAL
Qty: 30 EACH | Refills: 0 | Status: ACTIVE | COMMUNITY

## 2023-09-20 RX ORDER — CHOLECALCIFEROL (VITAMIN D3) 125 MCG
VITAMIN D3( 125 MCG(5000 UT) ORAL   ) ACTIVE -HX ENTRY CAPSULE ORAL
Status: ACTIVE | COMMUNITY
Start: 2020-09-16

## 2023-09-20 RX ORDER — HYDROCORTISONE ACETATE 25 MG/1
1 SUPPOSITORY SUPPOSITORY RECTAL TWICE A DAY
Qty: 14 | Refills: 2 | OUTPATIENT
Start: 2023-09-20 | End: 2023-10-20

## 2023-09-20 RX ORDER — ACETAMINOPHEN 325 MG
BIOTIN( 5000MCG ORAL  DAILY ) ACTIVE -HX ENTRY TABLET ORAL DAILY
Status: ACTIVE | COMMUNITY
Start: 2020-09-16

## 2023-09-20 RX ORDER — FUROSEMIDE 20 MG/1
TABLET ORAL
Qty: 90 TABLET | Status: ACTIVE | COMMUNITY

## 2023-09-20 RX ORDER — ESCITALOPRAM 10 MG/1
TABLET, FILM COATED ORAL
Qty: 30 TABLET | Status: ACTIVE | COMMUNITY

## 2023-09-20 RX ORDER — LOSARTAN POTASSIUM 25 MG/1
TAKE 1 TABLET BY MOUTH EVERY EVENING TABLET, FILM COATED ORAL
Qty: 90 EACH | Refills: 0 | Status: ACTIVE | COMMUNITY

## 2023-09-20 RX ORDER — MULTIVIT-MIN/FOLIC/VIT K/LYCOP 400-300MCG
VITAMIN D3( 1000UNIT ORAL  DAILY ) ACTIVE -HX ENTRY TABLET ORAL DAILY
Status: ACTIVE | COMMUNITY
Start: 2020-09-16

## 2023-09-20 RX ORDER — WARFARIN SODIUM 5 MG/1
1 TABLET TABLET ORAL ONCE A DAY
Status: ACTIVE | COMMUNITY

## 2023-09-20 RX ORDER — LEVOTHYROXINE SODIUM 0.05 MG/1
TAKE 1 TABLET BY MOUTH EVERY DAY TABLET ORAL
Qty: 30 EACH | Refills: 0 | Status: ACTIVE | COMMUNITY

## 2023-09-20 RX ORDER — METHYLDOPA/HYDROCHLOROTHIAZIDE 250MG-15MG
VITAMIN K2( 100MCG ORAL   ) ACTIVE -HX ENTRY TABLET ORAL
Status: ACTIVE | COMMUNITY
Start: 2020-09-16

## 2023-09-20 RX ORDER — POTASSIUM CHLORIDE 10 MEQ/1
TABLET, FILM COATED, EXTENDED RELEASE ORAL
Qty: 180 TABLET | Status: ACTIVE | COMMUNITY

## 2023-09-20 RX ORDER — ZOLPIDEM TARTRATE 5 MG/1
TAKE 1 TABLET BY MOUTH AT BEDTIME TABLET, FILM COATED ORAL
Qty: 90 EACH | Refills: 0 | Status: ACTIVE | COMMUNITY

## 2023-09-20 RX ORDER — LIOTHYRONINE SODIUM 5 UG/1
TABLET ORAL
Qty: 90 TABLET | Status: ACTIVE | COMMUNITY

## 2023-09-25 ENCOUNTER — CLAIMS CREATED FROM THE CLAIM WINDOW (OUTPATIENT)
Dept: URBAN - METROPOLITAN AREA SURGERY CENTER 12 | Facility: SURGERY CENTER | Age: 83
End: 2023-09-25
Payer: MEDICARE

## 2023-09-25 DIAGNOSIS — K64.1 SECOND DEGREE HEMORRHOIDS: ICD-10-CM

## 2023-09-25 DIAGNOSIS — K62.5 HEMORRHAGE OF ANUS AND RECTUM: ICD-10-CM

## 2023-09-25 PROCEDURE — 45330 DIAGNOSTIC SIGMOIDOSCOPY: CPT | Performed by: INTERNAL MEDICINE

## 2023-09-25 PROCEDURE — 45330 DIAGNOSTIC SIGMOIDOSCOPY: CPT | Performed by: CLINIC/CENTER

## 2023-09-25 RX ORDER — METHYLDOPA/HYDROCHLOROTHIAZIDE 250MG-15MG
VITAMIN K2( 100MCG ORAL   ) ACTIVE -HX ENTRY TABLET ORAL
Status: ACTIVE | COMMUNITY
Start: 2020-09-16

## 2023-09-25 RX ORDER — LEVOTHYROXINE SODIUM 0.05 MG/1
TAKE 1 TABLET BY MOUTH EVERY DAY TABLET ORAL
Qty: 30 EACH | Refills: 0 | Status: ACTIVE | COMMUNITY

## 2023-09-25 RX ORDER — HYDROCORTISONE ACETATE 25 MG/1
1 SUPPOSITORY SUPPOSITORY RECTAL TWICE A DAY
Qty: 14 | Refills: 2 | Status: ACTIVE | COMMUNITY
Start: 2023-09-20 | End: 2023-10-20

## 2023-09-25 RX ORDER — POTASSIUM CHLORIDE 10 MEQ/1
TABLET, FILM COATED, EXTENDED RELEASE ORAL
Qty: 180 TABLET | Status: ACTIVE | COMMUNITY

## 2023-09-25 RX ORDER — MULTIVIT-MIN/FOLIC/VIT K/LYCOP 400-300MCG
VITAMIN D3( 1000UNIT ORAL  DAILY ) ACTIVE -HX ENTRY TABLET ORAL DAILY
Status: ACTIVE | COMMUNITY
Start: 2020-09-16

## 2023-09-25 RX ORDER — ACETAMINOPHEN 325 MG
BIOTIN( 5000MCG ORAL  DAILY ) ACTIVE -HX ENTRY TABLET ORAL DAILY
Status: ACTIVE | COMMUNITY
Start: 2020-09-16

## 2023-09-25 RX ORDER — ESCITALOPRAM 10 MG/1
TABLET, FILM COATED ORAL
Qty: 30 TABLET | Status: ACTIVE | COMMUNITY

## 2023-09-25 RX ORDER — DONEPEZIL HYDROCHLORIDE 5 MG/1
TAKE 1 TABLET BY MOUTH EVERY DAY TABLET, FILM COATED ORAL
Qty: 30 EACH | Refills: 0 | Status: ACTIVE | COMMUNITY

## 2023-09-25 RX ORDER — CHOLECALCIFEROL (VITAMIN D3) 125 MCG
VITAMIN D3( 125 MCG(5000 UT) ORAL   ) ACTIVE -HX ENTRY CAPSULE ORAL
Status: ACTIVE | COMMUNITY
Start: 2020-09-16

## 2023-09-25 RX ORDER — FUROSEMIDE 20 MG/1
TABLET ORAL
Qty: 90 TABLET | Status: ACTIVE | COMMUNITY

## 2023-09-25 RX ORDER — ZOLPIDEM TARTRATE 5 MG/1
TAKE 1 TABLET BY MOUTH AT BEDTIME TABLET, FILM COATED ORAL
Qty: 90 EACH | Refills: 0 | Status: ACTIVE | COMMUNITY

## 2023-09-25 RX ORDER — LIOTHYRONINE SODIUM 5 UG/1
TABLET ORAL
Qty: 90 TABLET | Status: ACTIVE | COMMUNITY

## 2023-09-25 RX ORDER — WARFARIN SODIUM 5 MG/1
1 TABLET TABLET ORAL ONCE A DAY
Status: ACTIVE | COMMUNITY

## 2023-09-25 RX ORDER — LOSARTAN POTASSIUM 25 MG/1
TAKE 1 TABLET BY MOUTH EVERY EVENING TABLET, FILM COATED ORAL
Qty: 90 EACH | Refills: 0 | Status: ACTIVE | COMMUNITY

## 2023-10-10 ENCOUNTER — OFFICE VISIT (OUTPATIENT)
Dept: URBAN - METROPOLITAN AREA CLINIC 68 | Facility: CLINIC | Age: 83
End: 2023-10-10
Payer: MEDICARE

## 2023-10-10 VITALS
BODY MASS INDEX: 19.63 KG/M2 | DIASTOLIC BLOOD PRESSURE: 68 MMHG | SYSTOLIC BLOOD PRESSURE: 126 MMHG | OXYGEN SATURATION: 98 % | WEIGHT: 110.8 LBS | HEIGHT: 63 IN | HEART RATE: 56 BPM

## 2023-10-10 DIAGNOSIS — K64.1 GRADE II HEMORRHOIDS: ICD-10-CM

## 2023-10-10 PROCEDURE — 99213 OFFICE O/P EST LOW 20 MIN: CPT | Performed by: INTERNAL MEDICINE

## 2023-10-10 PROCEDURE — 46221 LIGATION OF HEMORRHOID(S): CPT | Performed by: INTERNAL MEDICINE

## 2023-10-10 RX ORDER — ACETAMINOPHEN 325 MG
BIOTIN( 5000MCG ORAL  DAILY ) ACTIVE -HX ENTRY TABLET ORAL DAILY
Status: ACTIVE | COMMUNITY
Start: 2020-09-16

## 2023-10-10 RX ORDER — LOSARTAN POTASSIUM 25 MG/1
TAKE 1 TABLET BY MOUTH EVERY EVENING TABLET, FILM COATED ORAL
Qty: 90 EACH | Refills: 0 | Status: ACTIVE | COMMUNITY

## 2023-10-10 RX ORDER — MULTIVIT-MIN/FOLIC/VIT K/LYCOP 400-300MCG
VITAMIN D3( 1000UNIT ORAL  DAILY ) ACTIVE -HX ENTRY TABLET ORAL DAILY
Status: ACTIVE | COMMUNITY
Start: 2020-09-16

## 2023-10-10 RX ORDER — ESCITALOPRAM 10 MG/1
TABLET, FILM COATED ORAL
Qty: 30 TABLET | Status: ACTIVE | COMMUNITY

## 2023-10-10 RX ORDER — LIOTHYRONINE SODIUM 5 UG/1
TABLET ORAL
Qty: 90 TABLET | Status: ACTIVE | COMMUNITY

## 2023-10-10 RX ORDER — METHYLDOPA/HYDROCHLOROTHIAZIDE 250MG-15MG
VITAMIN K2( 100MCG ORAL   ) ACTIVE -HX ENTRY TABLET ORAL
Status: ACTIVE | COMMUNITY
Start: 2020-09-16

## 2023-10-10 RX ORDER — CHOLECALCIFEROL (VITAMIN D3) 125 MCG
VITAMIN D3( 125 MCG(5000 UT) ORAL   ) ACTIVE -HX ENTRY CAPSULE ORAL
Status: ACTIVE | COMMUNITY
Start: 2020-09-16

## 2023-10-10 RX ORDER — HYDROCORTISONE ACETATE 25 MG/1
1 SUPPOSITORY SUPPOSITORY RECTAL TWICE A DAY
Qty: 14 | Refills: 2 | Status: ACTIVE | COMMUNITY
Start: 2023-09-20 | End: 2023-10-20

## 2023-10-10 RX ORDER — WARFARIN SODIUM 5 MG/1
1 TABLET TABLET ORAL ONCE A DAY
Status: ACTIVE | COMMUNITY

## 2023-10-10 RX ORDER — POTASSIUM CHLORIDE 10 MEQ/1
TABLET, FILM COATED, EXTENDED RELEASE ORAL
Qty: 180 TABLET | Status: ACTIVE | COMMUNITY

## 2023-10-10 RX ORDER — DONEPEZIL HYDROCHLORIDE 5 MG/1
TAKE 1 TABLET BY MOUTH EVERY DAY TABLET, FILM COATED ORAL
Qty: 30 EACH | Refills: 0 | Status: ACTIVE | COMMUNITY

## 2023-10-10 RX ORDER — LEVOTHYROXINE SODIUM 0.05 MG/1
TAKE 1 TABLET BY MOUTH EVERY DAY TABLET ORAL
Qty: 30 EACH | Refills: 0 | Status: ACTIVE | COMMUNITY

## 2023-10-10 RX ORDER — FUROSEMIDE 20 MG/1
TABLET ORAL
Qty: 90 TABLET | Status: ACTIVE | COMMUNITY

## 2023-10-10 RX ORDER — ZOLPIDEM TARTRATE 5 MG/1
TAKE 1 TABLET BY MOUTH AT BEDTIME TABLET, FILM COATED ORAL
Qty: 90 EACH | Refills: 0 | Status: ACTIVE | COMMUNITY

## 2023-10-10 NOTE — HPI-TODAY'S VISIT:
83 y.o. WF with a history of hemorrhoids who is here for another hemorrhoid banding. She does have hemorrhoidal prolapse. She denies any rectal pain.

## 2023-10-24 ENCOUNTER — OFFICE VISIT (OUTPATIENT)
Dept: URBAN - METROPOLITAN AREA CLINIC 68 | Facility: CLINIC | Age: 83
End: 2023-10-24

## 2023-10-24 RX ORDER — MULTIVIT-MIN/FOLIC/VIT K/LYCOP 400-300MCG
VITAMIN D3( 1000UNIT ORAL  DAILY ) ACTIVE -HX ENTRY TABLET ORAL DAILY
Status: ACTIVE | COMMUNITY
Start: 2020-09-16

## 2023-10-24 RX ORDER — DONEPEZIL HYDROCHLORIDE 5 MG/1
TAKE 1 TABLET BY MOUTH EVERY DAY TABLET, FILM COATED ORAL
Qty: 30 EACH | Refills: 0 | Status: ACTIVE | COMMUNITY

## 2023-10-24 RX ORDER — LEVOTHYROXINE SODIUM 0.05 MG/1
TAKE 1 TABLET BY MOUTH EVERY DAY TABLET ORAL
Qty: 30 EACH | Refills: 0 | Status: ACTIVE | COMMUNITY

## 2023-10-24 RX ORDER — METHYLDOPA/HYDROCHLOROTHIAZIDE 250MG-15MG
VITAMIN K2( 100MCG ORAL   ) ACTIVE -HX ENTRY TABLET ORAL
Status: ACTIVE | COMMUNITY
Start: 2020-09-16

## 2023-10-24 RX ORDER — LOSARTAN POTASSIUM 25 MG/1
TAKE 1 TABLET BY MOUTH EVERY EVENING TABLET, FILM COATED ORAL
Qty: 90 EACH | Refills: 0 | Status: ACTIVE | COMMUNITY

## 2023-10-24 RX ORDER — WARFARIN SODIUM 5 MG/1
1 TABLET TABLET ORAL ONCE A DAY
Status: ACTIVE | COMMUNITY

## 2023-10-24 RX ORDER — ESCITALOPRAM 10 MG/1
TABLET, FILM COATED ORAL
Qty: 30 TABLET | Status: ACTIVE | COMMUNITY

## 2023-10-24 RX ORDER — CHOLECALCIFEROL (VITAMIN D3) 125 MCG
VITAMIN D3( 125 MCG(5000 UT) ORAL   ) ACTIVE -HX ENTRY CAPSULE ORAL
Status: ACTIVE | COMMUNITY
Start: 2020-09-16

## 2023-10-24 RX ORDER — POTASSIUM CHLORIDE 10 MEQ/1
TABLET, FILM COATED, EXTENDED RELEASE ORAL
Qty: 180 TABLET | Status: ACTIVE | COMMUNITY

## 2023-10-24 RX ORDER — ACETAMINOPHEN 325 MG
BIOTIN( 5000MCG ORAL  DAILY ) ACTIVE -HX ENTRY TABLET ORAL DAILY
Status: ACTIVE | COMMUNITY
Start: 2020-09-16

## 2023-10-24 RX ORDER — ZOLPIDEM TARTRATE 5 MG/1
TAKE 1 TABLET BY MOUTH AT BEDTIME TABLET, FILM COATED ORAL
Qty: 90 EACH | Refills: 0 | Status: ACTIVE | COMMUNITY

## 2023-10-24 RX ORDER — FUROSEMIDE 20 MG/1
TABLET ORAL
Qty: 90 TABLET | Status: ACTIVE | COMMUNITY

## 2023-10-24 RX ORDER — LIOTHYRONINE SODIUM 5 UG/1
TABLET ORAL
Qty: 90 TABLET | Status: ACTIVE | COMMUNITY

## 2023-10-26 ENCOUNTER — OFFICE VISIT (OUTPATIENT)
Dept: URBAN - METROPOLITAN AREA CLINIC 66 | Facility: CLINIC | Age: 83
End: 2023-10-26
Payer: MEDICARE

## 2023-10-26 ENCOUNTER — OFFICE VISIT (OUTPATIENT)
Dept: URBAN - METROPOLITAN AREA CLINIC 66 | Facility: CLINIC | Age: 83
End: 2023-10-26

## 2023-10-26 VITALS
DIASTOLIC BLOOD PRESSURE: 70 MMHG | OXYGEN SATURATION: 96 % | BODY MASS INDEX: 19.49 KG/M2 | WEIGHT: 110 LBS | HEART RATE: 68 BPM | SYSTOLIC BLOOD PRESSURE: 118 MMHG | HEIGHT: 63 IN

## 2023-10-26 VITALS — HEIGHT: 63 IN

## 2023-10-26 DIAGNOSIS — K64.1 GRADE II HEMORRHOIDS: ICD-10-CM

## 2023-10-26 PROCEDURE — 99213 OFFICE O/P EST LOW 20 MIN: CPT | Performed by: INTERNAL MEDICINE

## 2023-10-26 PROCEDURE — 46221 LIGATION OF HEMORRHOID(S): CPT | Performed by: INTERNAL MEDICINE

## 2023-10-26 RX ORDER — METHYLDOPA/HYDROCHLOROTHIAZIDE 250MG-15MG
VITAMIN K2( 100MCG ORAL   ) ACTIVE -HX ENTRY TABLET ORAL
Status: ACTIVE | COMMUNITY
Start: 2020-09-16

## 2023-10-26 RX ORDER — DONEPEZIL HYDROCHLORIDE 5 MG/1
TAKE 1 TABLET BY MOUTH EVERY DAY TABLET, FILM COATED ORAL
Qty: 30 EACH | Refills: 0 | Status: ACTIVE | COMMUNITY

## 2023-10-26 RX ORDER — POTASSIUM CHLORIDE 10 MEQ/1
TABLET, FILM COATED, EXTENDED RELEASE ORAL
Qty: 180 TABLET | Status: ACTIVE | COMMUNITY

## 2023-10-26 RX ORDER — LEVOTHYROXINE SODIUM 0.05 MG/1
TAKE 1 TABLET BY MOUTH EVERY DAY TABLET ORAL
Qty: 30 EACH | Refills: 0 | Status: ACTIVE | COMMUNITY

## 2023-10-26 RX ORDER — ESCITALOPRAM 10 MG/1
TABLET, FILM COATED ORAL
Qty: 30 TABLET | Status: ACTIVE | COMMUNITY

## 2023-10-26 RX ORDER — MULTIVIT-MIN/FOLIC/VIT K/LYCOP 400-300MCG
VITAMIN D3( 1000UNIT ORAL  DAILY ) ACTIVE -HX ENTRY TABLET ORAL DAILY
Status: ACTIVE | COMMUNITY
Start: 2020-09-16

## 2023-10-26 RX ORDER — ACETAMINOPHEN 325 MG
BIOTIN( 5000MCG ORAL  DAILY ) ACTIVE -HX ENTRY TABLET ORAL DAILY
Status: ACTIVE | COMMUNITY
Start: 2020-09-16

## 2023-10-26 RX ORDER — LIOTHYRONINE SODIUM 5 UG/1
TABLET ORAL
Qty: 90 TABLET | Status: ACTIVE | COMMUNITY

## 2023-10-26 RX ORDER — CHOLECALCIFEROL (VITAMIN D3) 125 MCG
VITAMIN D3( 125 MCG(5000 UT) ORAL   ) ACTIVE -HX ENTRY CAPSULE ORAL
Status: ACTIVE | COMMUNITY
Start: 2020-09-16

## 2023-10-26 RX ORDER — ZOLPIDEM TARTRATE 5 MG/1
TAKE 1 TABLET BY MOUTH AT BEDTIME TABLET, FILM COATED ORAL
Qty: 90 EACH | Refills: 0 | Status: ACTIVE | COMMUNITY

## 2023-10-26 RX ORDER — FUROSEMIDE 20 MG/1
TABLET ORAL
Qty: 90 TABLET | Status: ACTIVE | COMMUNITY

## 2023-10-26 RX ORDER — WARFARIN SODIUM 5 MG/1
1 TABLET TABLET ORAL ONCE A DAY
Status: ACTIVE | COMMUNITY

## 2023-10-26 RX ORDER — LOSARTAN POTASSIUM 25 MG/1
TAKE 1 TABLET BY MOUTH EVERY EVENING TABLET, FILM COATED ORAL
Qty: 90 EACH | Refills: 0 | Status: ACTIVE | COMMUNITY

## 2023-10-26 NOTE — HPI-TODAY'S VISIT:
83 y.o. WF with a history of hemorrhoids who is here for another hemorrhoid banding. She does have hemorrhoidal prolapse and a hemorrhoid band was placed in R anterior column w/o complications. She denies any bleeding at this time.

## 2023-11-07 ENCOUNTER — OFFICE VISIT (OUTPATIENT)
Dept: URBAN - METROPOLITAN AREA CLINIC 68 | Facility: CLINIC | Age: 83
End: 2023-11-07
Payer: MEDICARE

## 2023-11-07 VITALS
HEIGHT: 63 IN | OXYGEN SATURATION: 98 % | BODY MASS INDEX: 19.49 KG/M2 | DIASTOLIC BLOOD PRESSURE: 60 MMHG | SYSTOLIC BLOOD PRESSURE: 108 MMHG | WEIGHT: 110 LBS | HEART RATE: 68 BPM

## 2023-11-07 DIAGNOSIS — K64.1 GRADE II HEMORRHOIDS: ICD-10-CM

## 2023-11-07 PROCEDURE — 99213 OFFICE O/P EST LOW 20 MIN: CPT | Performed by: INTERNAL MEDICINE

## 2023-11-07 PROCEDURE — 46221 LIGATION OF HEMORRHOID(S): CPT | Performed by: INTERNAL MEDICINE

## 2023-11-07 RX ORDER — METHYLDOPA/HYDROCHLOROTHIAZIDE 250MG-15MG
VITAMIN K2( 100MCG ORAL   ) ACTIVE -HX ENTRY TABLET ORAL
Status: ACTIVE | COMMUNITY
Start: 2020-09-16

## 2023-11-07 RX ORDER — WARFARIN SODIUM 5 MG/1
1 TABLET TABLET ORAL ONCE A DAY
Status: ACTIVE | COMMUNITY

## 2023-11-07 RX ORDER — POTASSIUM CHLORIDE 10 MEQ/1
TABLET, FILM COATED, EXTENDED RELEASE ORAL
Qty: 180 TABLET | Status: ACTIVE | COMMUNITY

## 2023-11-07 RX ORDER — LEVOTHYROXINE SODIUM 0.05 MG/1
TAKE 1 TABLET BY MOUTH EVERY DAY TABLET ORAL
Qty: 30 EACH | Refills: 0 | Status: ACTIVE | COMMUNITY

## 2023-11-07 RX ORDER — MULTIVIT-MIN/FOLIC/VIT K/LYCOP 400-300MCG
VITAMIN D3( 1000UNIT ORAL  DAILY ) ACTIVE -HX ENTRY TABLET ORAL DAILY
Status: ACTIVE | COMMUNITY
Start: 2020-09-16

## 2023-11-07 RX ORDER — ACETAMINOPHEN 325 MG
BIOTIN( 5000MCG ORAL  DAILY ) ACTIVE -HX ENTRY TABLET ORAL DAILY
Status: ACTIVE | COMMUNITY
Start: 2020-09-16

## 2023-11-07 RX ORDER — ESCITALOPRAM 10 MG/1
TABLET, FILM COATED ORAL
Qty: 30 TABLET | Status: ACTIVE | COMMUNITY

## 2023-11-07 RX ORDER — DONEPEZIL HYDROCHLORIDE 5 MG/1
TAKE 1 TABLET BY MOUTH EVERY DAY TABLET, FILM COATED ORAL
Qty: 30 EACH | Refills: 0 | Status: ACTIVE | COMMUNITY

## 2023-11-07 RX ORDER — FUROSEMIDE 20 MG/1
TABLET ORAL
Qty: 90 TABLET | Status: ACTIVE | COMMUNITY

## 2023-11-07 RX ORDER — LOSARTAN POTASSIUM 25 MG/1
TAKE 1 TABLET BY MOUTH EVERY EVENING TABLET, FILM COATED ORAL
Qty: 90 EACH | Refills: 0 | Status: ACTIVE | COMMUNITY

## 2023-11-07 RX ORDER — ZOLPIDEM TARTRATE 5 MG/1
TAKE 1 TABLET BY MOUTH AT BEDTIME TABLET, FILM COATED ORAL
Qty: 90 EACH | Refills: 0 | Status: ACTIVE | COMMUNITY

## 2023-11-07 RX ORDER — CHOLECALCIFEROL (VITAMIN D3) 125 MCG
VITAMIN D3( 125 MCG(5000 UT) ORAL   ) ACTIVE -HX ENTRY CAPSULE ORAL
Status: ACTIVE | COMMUNITY
Start: 2020-09-16

## 2023-11-07 RX ORDER — LIOTHYRONINE SODIUM 5 UG/1
TABLET ORAL
Qty: 90 TABLET | Status: ACTIVE | COMMUNITY

## 2023-11-07 NOTE — HPI-TODAY'S VISIT:
83 y.o. WF with a history of hemorrhoids who is here for another hemorrhoid banding. She does have hemorrhoidal prolapse and a hemorrhoid band was placed in Left lateral column w/o complications. She denies any bleeding at this time and does feel that hemorrhoid banding has helped her. She denies any constipation or straining.

## 2023-11-27 NOTE — HPI-TODAY'S VISIT:
83 y.o. WF with chronic constipation ongoing for years who is here for follow up of constipation. She is s/p a colonoscopy in 2015 which showed hemorrhoids. She is on chronic Warfarin therapy and has noticed episodic rectal bleeding. She is agreeable to have a flex sig w/o anesthesia and w/o a prep. She denies any rectal pain. She is recommended to try a Squatty Potty. Plan: Amlactin Lotion or Gold Bond Rough & Bumpy skin cream twice daily. Render In Strict Bullet Format?: No Detail Level: Simple Detail Level: Zone Initiate Treatment: Hydroquinone lotion to dark spots twice daily for 3 months then 1 month break.

## 2024-01-19 ENCOUNTER — DASHBOARD ENCOUNTERS (OUTPATIENT)
Age: 84
End: 2024-01-19

## 2024-01-24 ENCOUNTER — OFFICE VISIT (OUTPATIENT)
Dept: URBAN - METROPOLITAN AREA CLINIC 66 | Facility: CLINIC | Age: 84
End: 2024-01-24
Payer: MEDICARE

## 2024-01-24 VITALS
WEIGHT: 113 LBS | OXYGEN SATURATION: 98 % | DIASTOLIC BLOOD PRESSURE: 80 MMHG | BODY MASS INDEX: 20.02 KG/M2 | HEIGHT: 63 IN | HEART RATE: 56 BPM | SYSTOLIC BLOOD PRESSURE: 110 MMHG

## 2024-01-24 DIAGNOSIS — K59.09 CHRONIC CONSTIPATION: ICD-10-CM

## 2024-01-24 DIAGNOSIS — K64.8 OTHER HEMORRHOIDS: ICD-10-CM

## 2024-01-24 DIAGNOSIS — K62.5 RECTAL BLEEDING: ICD-10-CM

## 2024-01-24 PROCEDURE — 99214 OFFICE O/P EST MOD 30 MIN: CPT | Performed by: INTERNAL MEDICINE

## 2024-01-24 RX ORDER — LOSARTAN POTASSIUM 25 MG/1
TAKE 1 TABLET BY MOUTH EVERY EVENING TABLET, FILM COATED ORAL
Qty: 90 EACH | Refills: 0 | Status: ACTIVE | COMMUNITY

## 2024-01-24 RX ORDER — DONEPEZIL HYDROCHLORIDE 5 MG/1
TAKE 1 TABLET BY MOUTH EVERY DAY TABLET, FILM COATED ORAL
Qty: 30 EACH | Refills: 0 | Status: ACTIVE | COMMUNITY

## 2024-01-24 RX ORDER — ACETAMINOPHEN 325 MG
BIOTIN( 5000MCG ORAL  DAILY ) ACTIVE -HX ENTRY TABLET ORAL DAILY
Status: ACTIVE | COMMUNITY
Start: 2020-09-16

## 2024-01-24 RX ORDER — METHYLDOPA/HYDROCHLOROTHIAZIDE 250MG-15MG
VITAMIN K2( 100MCG ORAL   ) ACTIVE -HX ENTRY TABLET ORAL
Status: ACTIVE | COMMUNITY
Start: 2020-09-16

## 2024-01-24 RX ORDER — HYDROCORTISONE ACETATE 25 MG/1
1 SUPPOSITORY SUPPOSITORY RECTAL ONCE A DAY
Qty: 20 SUPPOSITORY | Refills: 2 | OUTPATIENT
Start: 2024-01-24 | End: 2024-02-23

## 2024-01-24 RX ORDER — CHOLECALCIFEROL (VITAMIN D3) 125 MCG
VITAMIN D3( 125 MCG(5000 UT) ORAL   ) ACTIVE -HX ENTRY CAPSULE ORAL
Status: ACTIVE | COMMUNITY
Start: 2020-09-16

## 2024-01-24 RX ORDER — LIOTHYRONINE SODIUM 5 UG/1
TABLET ORAL
Qty: 90 TABLET | Status: ACTIVE | COMMUNITY

## 2024-01-24 RX ORDER — POTASSIUM CHLORIDE 10 MEQ/1
TABLET, FILM COATED, EXTENDED RELEASE ORAL
Qty: 180 TABLET | Status: ACTIVE | COMMUNITY

## 2024-01-24 RX ORDER — MULTIVIT-MIN/FOLIC/VIT K/LYCOP 400-300MCG
VITAMIN D3( 1000UNIT ORAL  DAILY ) ACTIVE -HX ENTRY TABLET ORAL DAILY
Status: ACTIVE | COMMUNITY
Start: 2020-09-16

## 2024-01-24 RX ORDER — ZOLPIDEM TARTRATE 5 MG/1
TAKE 1 TABLET BY MOUTH AT BEDTIME TABLET, FILM COATED ORAL
Qty: 90 EACH | Refills: 0 | Status: ACTIVE | COMMUNITY

## 2024-01-24 RX ORDER — FUROSEMIDE 20 MG/1
TABLET ORAL
Qty: 90 TABLET | Status: ACTIVE | COMMUNITY

## 2024-01-24 RX ORDER — WARFARIN SODIUM 5 MG/1
1 TABLET TABLET ORAL ONCE A DAY
Status: ACTIVE | COMMUNITY

## 2024-01-24 RX ORDER — ESCITALOPRAM 10 MG/1
TABLET, FILM COATED ORAL
Qty: 30 TABLET | Status: ACTIVE | COMMUNITY

## 2024-01-24 RX ORDER — LEVOTHYROXINE SODIUM 0.05 MG/1
TAKE 1 TABLET BY MOUTH EVERY DAY TABLET ORAL
Qty: 30 EACH | Refills: 0 | Status: ACTIVE | COMMUNITY

## 2024-01-24 NOTE — HPI-TODAY'S VISIT:
83 y.o. WF with CAD and a history of AVR replacement over 3 years ago who has  a history of hemorrhoids and constipation who is s/p hemorrhoid banding who is here for futher management of constipation. She is now taking 2 tablespoons of Olive Oil and flax seed. A flex sig on 9/25/2023 showe IH and external hemorrhoids. She has tried Miralax in the past and wants to take something natural like prunes instead. She is recommended to try Anusol suppositories bid. She defers a colonoscopy for evaluation at this time or referral to a coloretal surgeon for persistent hemorrhoidal bleeding. She is recommended to have a follow up in 3 weeks and to have her daughter come with her to discuss options and management. She is encouraged to try Prune slurry and Ibgard prn.

## 2024-01-25 ENCOUNTER — TELEPHONE ENCOUNTER (OUTPATIENT)
Dept: URBAN - METROPOLITAN AREA CLINIC 68 | Facility: CLINIC | Age: 84
End: 2024-01-25